# Patient Record
Sex: MALE | Race: ASIAN | NOT HISPANIC OR LATINO | URBAN - METROPOLITAN AREA
[De-identification: names, ages, dates, MRNs, and addresses within clinical notes are randomized per-mention and may not be internally consistent; named-entity substitution may affect disease eponyms.]

---

## 2022-07-06 ENCOUNTER — EMERGENCY (EMERGENCY)
Facility: HOSPITAL | Age: 62
LOS: 0 days | Discharge: HOME | End: 2022-07-06
Attending: STUDENT IN AN ORGANIZED HEALTH CARE EDUCATION/TRAINING PROGRAM
Payer: MEDICAID

## 2022-07-06 VITALS
OXYGEN SATURATION: 99 % | HEART RATE: 85 BPM | DIASTOLIC BLOOD PRESSURE: 77 MMHG | SYSTOLIC BLOOD PRESSURE: 142 MMHG | TEMPERATURE: 98 F | RESPIRATION RATE: 18 BRPM

## 2022-07-06 VITALS — WEIGHT: 176.37 LBS | HEIGHT: 68 IN
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
## 2022-07-06 DIAGNOSIS — E11.9 TYPE 2 DIABETES MELLITUS WITHOUT COMPLICATIONS: ICD-10-CM
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
## 2022-07-06 DIAGNOSIS — M54.50 LOW BACK PAIN, UNSPECIFIED: ICD-10-CM

## 2022-07-06 DIAGNOSIS — S32.019A UNSPECIFIED FRACTURE OF FIRST LUMBAR VERTEBRA, INITIAL ENCOUNTER FOR CLOSED FRACTURE: ICD-10-CM

## 2022-07-06 DIAGNOSIS — S22.41XA MULTIPLE FRACTURES OF RIBS, RIGHT SIDE, INITIAL ENCOUNTER FOR CLOSED FRACTURE: ICD-10-CM

## 2022-07-06 PROCEDURE — 72131 CT LUMBAR SPINE W/O DYE: CPT | Mod: 26,MA

## 2022-07-06 PROCEDURE — 99284 EMERGENCY DEPT VISIT MOD MDM: CPT | Mod: 25

## 2022-07-06 PROCEDURE — 72128 CT CHEST SPINE W/O DYE: CPT | Mod: 26,MA

## 2022-07-06 PROCEDURE — 72131 CT LUMBAR SPINE W/O DYE: CPT | Mod: MA

## 2022-07-06 PROCEDURE — 99285 EMERGENCY DEPT VISIT HI MDM: CPT

## 2022-07-06 PROCEDURE — 72128 CT CHEST SPINE W/O DYE: CPT | Mod: MA

## 2022-07-06 RX ORDER — OXYCODONE HYDROCHLORIDE 5 MG/1
1 TABLET ORAL
Qty: 9 | Refills: 0
Start: 2022-07-06 | End: 2022-07-08

## 2022-07-06 RX ORDER — ACETAMINOPHEN 500 MG
650 TABLET ORAL ONCE
Refills: 0 | Status: COMPLETED | OUTPATIENT
Start: 2022-07-06 | End: 2022-07-06

## 2022-07-06 NOTE — ED STATDOCS - OBJECTIVE STATEMENT
61 y/o male with a PMHx of DMT2 presents to the ED with low back pain. Pt notes July 4th he fell and hit head back on the aluminum part of sliding door while he was on a boat. Pt notes sitting, getting up and laying down exacerbate the pain on L side. No AC use. Pt has been taking Advil and Salonpas patch with no relief. 61 y/o male with a PMHx of DMT2 presents to the ED with low back pain. Pt notes July 4th he fell and hit his mid back on boat;  Pt reports pain with change in position and movement; no head injury; no chest pain; no sob; no abdominal pain; no weakness in arms or legs; No AC use. Pt has been taking Advil and Salonpas patch with no relief.

## 2022-07-06 NOTE — ED ADULT NURSE NOTE - NSIMPLEMENTINTERV_GEN_ALL_ED
Implemented All Fall Risk Interventions:  [place] to call system. Call bell, personal items and telephone within reach. Instruct patient to call for assistance. Room bathroom lighting operational. Non-slip footwear when patient is off stretcher. Physically safe environment: no spills, clutter or unnecessary equipment. Stretcher in lowest position, wheels locked, appropriate side rails in place. Provide visual cue, wrist band, yellow gown, etc. Monitor gait and stability. Monitor for mental status changes and reorient to person, place, and time. Review medications for side effects contributing to fall risk. Reinforce activity limits and safety measures with patient and family.

## 2022-07-06 NOTE — ED STATDOCS - PATIENT PORTAL LINK FT
You can access the FollowMyHealth Patient Portal offered by [facility] by registering at the following website: [email or web address]. By joining [email or web address]’s FollowMyHealth portal, you will also be able to view your health information using other applications (apps) compatible with our system.

## 2022-07-06 NOTE — ED STATDOCS - NS ED ATTENDING STATEMENT MOD
This was a shared visit with the VIOLETA. I reviewed and verified the documentation and independently performed the documented:

## 2022-07-06 NOTE — ED STATDOCS - CPE ED GASTRO NORM
Patient return from CT. Call light is within reach of patient. Bedside tele monitor reapplied. No acute distress noted.       [facility]  12/16/18 8286 normal...

## 2022-07-06 NOTE — ED STATDOCS - NSFOLLOWUPINSTRUCTIONS_ED_ALL_ED_FT
Rib Fracture    WHAT YOU NEED TO KNOW:    A rib fracture is a crack or break in a rib bone. Rib fractures usually heal within 6 weeks. You should be able to return to normal activities before that time. Do not wrap anything around your body to try to splint your ribs. This can prevent you from taking deep breaths and increases your risk for pneumonia.Rib Cage         DISCHARGE INSTRUCTIONS:    Call 911 for any of the following:     You have trouble breathing.      You have new or increased pain.    Return to the emergency department if:     Your pain does not get better, even after treatment.      You have a fever or a cough.     Contact your healthcare provider if:     You have questions or concerns about your condition or care.        Medicines:     NSAIDs help decrease swelling and pain. NSAIDs are available without a doctor's order. Ask your healthcare provider which medicine is right for you. Ask how much to take and when to take it. Take as directed. NSAIDs can cause stomach bleeding and kidney problems if not taken correctly.      Prescription pain medicine may be given. Ask your healthcare provider how to take this medicine safely. Some prescription pain medicines contain acetaminophen. Do not take other medicines that contain acetaminophen without talking to your healthcare provider. Too much acetaminophen may cause liver damage. Prescription pain medicine may cause constipation. Ask your healthcare provider how to prevent or treat constipation.       Take your medicine as directed. Contact your healthcare provider if you think your medicine is not helping or if you have side effects. Tell him or her if you are allergic to any medicine. Keep a list of the medicines, vitamins, and herbs you take. Include the amounts, and when and why you take them. Bring the list or the pill bottles to follow-up visits. Carry your medicine list with you in case of an emergency.    Follow up with your healthcare provider as directed: Write down your questions so you remember to ask them during your visits.    Deep breathing: Deep breathing will decrease your risk for pneumonia. Hug a pillow on the injured side while doing this exercise, to decrease pain. Take a deep breath and hold it for as long as possible. You should let the air out and then cough strongly. Deep breaths help open your airway. You may be given an incentive spirometer to help take deep breaths. Put the plastic piece in your mouth. Take a slow, deep breath. You should then let the air out and cough. Repeat these steps 10 times every hour.    Rest: Rest and limit activity to decrease swelling and pain, and allow your injury to heal. Avoid activities that may cause more pain or damage to your ribs such as, pulling, pushing, and lifting. As your pain decreases, begin movements slowly. Take short walks between rest periods.    Ice: Apply ice on the fractured area for 15 to 20 minutes every hour or as directed. Use an ice pack or put crushed ice in a plastic bag. Cover it with a towel. Ice helps prevent tissue damage and decreases swelling and pain.

## 2022-07-06 NOTE — ED STATDOCS - PROGRESS NOTE DETAILS
63 yo male presents with L sided lower back pain s/p slip and fall against a door on his boat 2 days ago. Pt tried to place a patch on eh area and take motrin without relief. No midline ttp. L sided ttp to the L back. No ecchymosis or abrasions. Decreased ROM from sitting to standing secondary to pain. Gait normal. Will obtain CT. Pt declined meds at this time. -Maximilian Henderson PA-C CT shows 11th and 12th rib fracture with transverse process fracture. Spoke with Dr. Padron. States there is nothing to do and its 2+ days out. If pt wants to be admitted for pain control he can. Spoke and discussed plan with pt. Pt aware but would like to go home. Gave strict return precautions. -Maximilian Henderson PA-C

## 2022-07-06 NOTE — ED STATDOCS - CARE PLAN
1 Principal Discharge DX:	Back pain due to injury   Principal Discharge DX:	Closed fracture of rib on left side  Secondary Diagnosis:	Lumbar transverse process fracture

## 2022-07-06 NOTE — ED STATDOCS - ATTENDING APP SHARED VISIT CONTRIBUTION OF CARE
I, Miesha Brown DO,  performed the initial face to face bedside interview with this patient regarding history of present illness, review of symptoms and relevant past medical, social and family history.  I completed an independent physical examination.  I was the initial provider who evaluated this patient.   I personally saw the patient and performed a substantive portion of the visit including all aspects of the medical decision making.  I have signed out the follow up of any pending tests (i.e. labs, radiological studies) to the ACP.  I have communicated the patient’s plan of care and disposition with the ACP.  The history, relevant review of systems, past medical and surgical history, medical decision making, and physical examination was documented by the scribe in my presence and I attest to the accuracy of the documentation.

## 2022-12-05 ENCOUNTER — OFFICE (OUTPATIENT)
Dept: URBAN - METROPOLITAN AREA CLINIC 102 | Facility: CLINIC | Age: 62
Setting detail: OPHTHALMOLOGY
End: 2022-12-05
Payer: COMMERCIAL

## 2022-12-05 DIAGNOSIS — H16.421: ICD-10-CM

## 2022-12-05 DIAGNOSIS — D31.30: ICD-10-CM

## 2022-12-05 DIAGNOSIS — H16.222: ICD-10-CM

## 2022-12-05 DIAGNOSIS — H35.40: ICD-10-CM

## 2022-12-05 DIAGNOSIS — H10.45: ICD-10-CM

## 2022-12-05 DIAGNOSIS — H16.223: ICD-10-CM

## 2022-12-05 DIAGNOSIS — H25.13: ICD-10-CM

## 2022-12-05 DIAGNOSIS — H16.221: ICD-10-CM

## 2022-12-05 DIAGNOSIS — E11.9: ICD-10-CM

## 2022-12-05 PROCEDURE — 92014 COMPRE OPH EXAM EST PT 1/>: CPT | Performed by: OPHTHALMOLOGY

## 2022-12-05 PROCEDURE — 92020 GONIOSCOPY: CPT | Performed by: OPHTHALMOLOGY

## 2022-12-05 PROCEDURE — 83861 MICROFLUID ANALY TEARS: CPT | Performed by: OPHTHALMOLOGY

## 2022-12-05 PROCEDURE — 92250 FUNDUS PHOTOGRAPHY W/I&R: CPT | Performed by: OPHTHALMOLOGY

## 2022-12-05 ASSESSMENT — VISUAL ACUITY
OD_BCVA: 20/25
OS_BCVA: 20/25-2

## 2022-12-05 ASSESSMENT — REFRACTION_CURRENTRX
OS_ADD: +2.25
OS_AXIS: 165
OS_CYLINDER: -2.50
OS_ADD: +2.25
OS_SPHERE: +1.50
OS_CYLINDER: -2.50
OD_SPHERE: +1.25
OD_OVR_VA: 20/
OD_AXIS: 025
OS_AXIS: 164
OD_OVR_VA: 20/
OD_ADD: +2.25
OD_SPHERE: +1.25
OD_AXIS: 30
OD_ADD: +2.25
OS_SPHERE: +1.75
OS_OVR_VA: 20/
OS_OVR_VA: 20/
OD_CYLINDER: -0.75
OD_CYLINDER: -0.75

## 2022-12-05 ASSESSMENT — REFRACTION_MANIFEST
OS_SPHERE: +1.50
OS_ADD: +2.25
OD_VA2: 20/20
OS_CYLINDER: -2.50
OD_ADD: +2.25
OD_CYLINDER: -1.00
OD_VA1: 20/20
OD_AXIS: 30
OD_SPHERE: +1.25
OS_AXIS: 165
OS_VA1: 20/20
OS_VA2: 20/20

## 2022-12-05 ASSESSMENT — VASCULARIZATION: OD_VASCULARIZATION: INFERIOR PANNUS

## 2022-12-05 ASSESSMENT — REFRACTION_AUTOREFRACTION
OS_AXIS: 161
OD_CYLINDER: -0.75
OD_AXIS: 014
OS_CYLINDER: -3.00
OS_SPHERE: +2.25
OD_SPHERE: +2.00

## 2022-12-05 ASSESSMENT — SPHEQUIV_DERIVED
OS_SPHEQUIV: 0.25
OD_SPHEQUIV: 0.75
OS_SPHEQUIV: 0.75
OD_SPHEQUIV: 1.625

## 2022-12-05 ASSESSMENT — CONFRONTATIONAL VISUAL FIELD TEST (CVF)
OS_FINDINGS: FULL
OD_FINDINGS: FULL

## 2022-12-05 ASSESSMENT — KERATOMETRY
OD_K1POWER_DIOPTERS: 43.25
METHOD_AUTO_MANUAL: AUTO
OS_AXISANGLE_DEGREES: 071
OS_K1POWER_DIOPTERS: 42.50
OS_K2POWER_DIOPTERS: 44.00
OD_K2POWER_DIOPTERS: 44.00
OD_AXISANGLE_DEGREES: 098

## 2022-12-05 ASSESSMENT — SUPERFICIAL PUNCTATE KERATITIS (SPK)
OD_SPK: T 1+
OS_SPK: T 1+

## 2022-12-05 ASSESSMENT — TONOMETRY
OD_IOP_MMHG: 10
OS_IOP_MMHG: 11

## 2022-12-05 ASSESSMENT — AXIALLENGTH_DERIVED
OD_AL: 22.9319
OS_AL: 23.5863
OS_AL: 23.3932
OD_AL: 23.2586

## 2024-07-24 ENCOUNTER — OFFICE (OUTPATIENT)
Dept: URBAN - METROPOLITAN AREA CLINIC 102 | Facility: CLINIC | Age: 64
Setting detail: OPHTHALMOLOGY
End: 2024-07-24
Payer: MEDICARE

## 2024-07-24 DIAGNOSIS — H16.222: ICD-10-CM

## 2024-07-24 DIAGNOSIS — H25.13: ICD-10-CM

## 2024-07-24 DIAGNOSIS — H16.421: ICD-10-CM

## 2024-07-24 DIAGNOSIS — E11.9: ICD-10-CM

## 2024-07-24 DIAGNOSIS — H16.221: ICD-10-CM

## 2024-07-24 DIAGNOSIS — H35.40: ICD-10-CM

## 2024-07-24 DIAGNOSIS — D31.30: ICD-10-CM

## 2024-07-24 DIAGNOSIS — H16.223: ICD-10-CM

## 2024-07-24 PROCEDURE — 92250 FUNDUS PHOTOGRAPHY W/I&R: CPT | Performed by: OPHTHALMOLOGY

## 2024-07-24 PROCEDURE — 83861 MICROFLUID ANALY TEARS: CPT | Mod: RT | Performed by: OPHTHALMOLOGY

## 2024-07-24 PROCEDURE — 83861 MICROFLUID ANALY TEARS: CPT | Mod: LT | Performed by: OPHTHALMOLOGY

## 2024-07-24 PROCEDURE — 92014 COMPRE OPH EXAM EST PT 1/>: CPT | Performed by: OPHTHALMOLOGY

## 2024-07-24 ASSESSMENT — CONFRONTATIONAL VISUAL FIELD TEST (CVF)
OS_FINDINGS: FULL
OD_FINDINGS: FULL

## 2024-07-26 ENCOUNTER — OFFICE (OUTPATIENT)
Dept: URBAN - METROPOLITAN AREA CLINIC 64 | Facility: CLINIC | Age: 64
Setting detail: OPHTHALMOLOGY
End: 2024-07-26
Payer: MEDICARE

## 2024-07-26 DIAGNOSIS — H90.3: ICD-10-CM

## 2024-07-26 PROCEDURE — 92557 COMPREHENSIVE HEARING TEST: CPT | Performed by: AUDIOLOGIST

## 2024-08-16 ENCOUNTER — OFFICE (OUTPATIENT)
Dept: URBAN - METROPOLITAN AREA CLINIC 102 | Facility: CLINIC | Age: 64
Setting detail: OPHTHALMOLOGY
End: 2024-08-16

## 2024-08-16 DIAGNOSIS — H90.3: ICD-10-CM

## 2024-08-16 PROCEDURE — 92593 HEARING AID CHECK; BINAURAL: CPT | Performed by: AUDIOLOGIST
